# Patient Record
Sex: MALE | Race: BLACK OR AFRICAN AMERICAN | Employment: OTHER | URBAN - METROPOLITAN AREA
[De-identification: names, ages, dates, MRNs, and addresses within clinical notes are randomized per-mention and may not be internally consistent; named-entity substitution may affect disease eponyms.]

---

## 2021-07-20 ENCOUNTER — HOSPITAL ENCOUNTER (EMERGENCY)
Age: 76
Discharge: HOME | End: 2021-07-20
Attending: STUDENT IN AN ORGANIZED HEALTH CARE EDUCATION/TRAINING PROGRAM
Payer: COMMERCIAL

## 2021-07-20 ENCOUNTER — APPOINTMENT (OUTPATIENT)
Dept: CT IMAGING | Age: 76
End: 2021-07-20
Attending: PHYSICIAN ASSISTANT
Payer: COMMERCIAL

## 2021-07-20 VITALS
SYSTOLIC BLOOD PRESSURE: 148 MMHG | RESPIRATION RATE: 16 BRPM | WEIGHT: 220 LBS | OXYGEN SATURATION: 96 % | DIASTOLIC BLOOD PRESSURE: 93 MMHG | BODY MASS INDEX: 28.23 KG/M2 | HEART RATE: 85 BPM | HEIGHT: 74 IN | TEMPERATURE: 97.1 F

## 2021-07-20 DIAGNOSIS — M54.42 ACUTE LEFT-SIDED LOW BACK PAIN WITH LEFT-SIDED SCIATICA: Primary | ICD-10-CM

## 2021-07-20 PROCEDURE — 72131 CT LUMBAR SPINE W/O DYE: CPT

## 2021-07-20 PROCEDURE — 74011000250 HC RX REV CODE- 250: Performed by: PHYSICIAN ASSISTANT

## 2021-07-20 PROCEDURE — 72192 CT PELVIS W/O DYE: CPT

## 2021-07-20 PROCEDURE — 99283 EMERGENCY DEPT VISIT LOW MDM: CPT

## 2021-07-20 RX ORDER — METHYLPREDNISOLONE 4 MG/1
TABLET ORAL
Qty: 1 DOSE PACK | Refills: 0 | Status: SHIPPED | OUTPATIENT
Start: 2021-07-20

## 2021-07-20 RX ORDER — LIDOCAINE 4 G/100G
1 PATCH TOPICAL EVERY 24 HOURS
Status: DISCONTINUED | OUTPATIENT
Start: 2021-07-20 | End: 2021-07-20 | Stop reason: HOSPADM

## 2021-07-20 NOTE — ED TRIAGE NOTES
Patient ambulated to triage using a cane with a steady gait-    Pt reports 6 weeks ago he had a fall and injured his right shoulder- shortly after the fall he developed left lower back/hip pain that radiates down his left leg    Pt reports he was seen at the [place] a month ago for all the same pain and was prescribed steroids and a cream but denies getting any relief.

## 2021-07-20 NOTE — ED PROVIDER NOTES
Harish Maciel is a 77 y.o. male who presents to ED with cc of 7/10 left lower back/hip pain which radiates down his left leg. Patient reports that 6 weeks ago, he had a fall and injured his right shoulder and had onset of lower back pain shortly after this. He reports being seen at the [place] 1 month prior for these same complaints for which they performed x-rays to regions of complaint and prescribed a course of steroids and a cream which he took without improvement. Pt denies additional symptoms of F/C, cough, congestion, CP, SOB, urinary or fecal changes, syncope, visual changes, neck pain, wound. PCP: Parisa Bernal MD    There are no other complaints verbalized at this time. No past medical history on file. No past surgical history on file. No family history on file. Social History     Socioeconomic History    Marital status: Not on file     Spouse name: Not on file    Number of children: Not on file    Years of education: Not on file    Highest education level: Not on file   Occupational History    Not on file   Tobacco Use    Smoking status: Not on file   Substance and Sexual Activity    Alcohol use: Not on file    Drug use: Not on file    Sexual activity: Not on file   Other Topics Concern    Not on file   Social History Narrative    Not on file     Social Determinants of Health     Financial Resource Strain:     Difficulty of Paying Living Expenses:    Food Insecurity:     Worried About Running Out of Food in the Last Year:     [address] in the Last Year:    Transportation Needs:     Lack of Transportation (Medical):      Lack of Transportation (Non-Medical):    Physical Activity:     Days of Exercise per Week:     Minutes of Exercise per Session:    Stress:     Feeling of Stress :    Social Connections:     Frequency of Communication with Friends and Family:     Frequency of Social Gatherings with Friends and Family:     Attends [employer] Services:     Active Member of Clubs or Organizations:     Attends Club or Organization Meetings:     Marital Status:    Intimate Partner Violence:     Fear of Current or Ex-Partner:     Emotionally Abused:     Physically Abused:     Sexually Abused: ALLERGIES: Patient has no allergy information on record. Review of Systems   Constitutional: Negative for chills and fever. HENT: Negative for congestion. Eyes: Negative for visual disturbance. Respiratory: Negative for cough and shortness of breath. Cardiovascular: Negative for chest pain. Gastrointestinal: Negative for abdominal pain, constipation, diarrhea, nausea and vomiting. Genitourinary: Negative for dysuria and frequency. Musculoskeletal: Positive for arthralgias and back pain. Negative for neck pain. Skin: Negative for wound. Neurological: Negative for syncope. All other systems reviewed and are negative. Vitals:    07/20/21 1236   BP: (!) 148/93   Pulse: 85   Resp: 16   Temp: 97.1 °F (36.2 °C)   SpO2: 96%   Weight: 99.8 kg (220 lb)   Height: 6' 2\" (1.88 m)            Physical Exam  Vitals and nursing note reviewed. Constitutional:       Appearance: He is not toxic-appearing or diaphoretic. HENT:      Head: Normocephalic. Right Ear: External ear normal.      Left Ear: External ear normal.   Eyes:      General: No scleral icterus. Conjunctiva/sclera: Conjunctivae normal.   Cardiovascular:      Rate and Rhythm: Normal rate. Pulmonary:      Effort: Pulmonary effort is normal. No respiratory distress. Abdominal:      General: There is no distension. Musculoskeletal:         General: No swelling or deformity. Cervical back: Normal range of motion. Thoracic back: No tenderness or bony tenderness. Lumbar back: Tenderness ( L paraspinal) present. No bony tenderness. Right hip: No bony tenderness. Left hip: No bony tenderness.       Comments: No focal tenderness to palpation aspects of right shoulder. Decreased ROM with abduction. 5/5 strength to biceps, triceps,  strength. 2+ radial pulse, capillary refill less than 2 seconds, warm extremity. Sensation intact grossly throughout. 5/5 strength to flexion/extension of hips, knees, ankles bilaterally. 2+ posterior tibial pulses, warm extremities, capillary refill less than 2 seconds. Sensation intact throughout BL LE. Skin:     General: Skin is warm and dry. Neurological:      Mental Status: He is alert and oriented to person, place, and time. Mental status is at baseline. Deep Tendon Reflexes:      Reflex Scores:       Patellar reflexes are 2+ on the right side and 2+ on the left side. Comments: Ambulatory with cane. MDM  Number of Diagnoses or Management Options     Amount and/or Complexity of Data Reviewed  Tests in the radiology section of CPT®: ordered and reviewed  Discuss the patient with other providers: yes (Dr. Baxter Ganser, ED attending  Dr. Cierra Abarca, orthopedics)           Procedures             CONSULT NOTE:   3:48 PM  Rosibel Hightower PA-C spoke with Dr Cierra Abarca and Ruddy PAK,   Specialty: Orthopedics  Discussed pt's hx, disposition, and available diagnostic and imaging results. Reviewed care plans. Recommends Medrol Dosepak and follow-up with orthospine as outpatient        VITAL SIGNS:  Vitals:    07/20/21 1236   BP: (!) 148/93   Pulse: 85   Resp: 16   Temp: 97.1 °F (36.2 °C)   SpO2: 96%   Weight: 99.8 kg (220 lb)   Height: 6' 2\" (1.88 m)         LABS:  No results found for this or any previous visit (from the past 24 hour(s)). IMAGING:  CT PELV WO CONT   Final Result      1. No evidence of hip fracture or other acute osseous abnormality. 2. Moderate bilateral hip DJD. CT SPINE LUMB WO CONT   Final Result      There is no acute fracture or dislocation. There is multilevel disc and facet degenerative change. Right renal calculi.       Moderate to severe canal stenosis and mild bilateral foraminal stenosis at L4-5. Additional, less severe degenerative findings are as described in detail above. Medications During Visit:  Medications - No data to display      DECISION MAKING:    Kika Hidalgo is a 77 y.o. male who comes in as above. Presents afebrile and hemodynamically stable. Reports pain to right shoulder and left lower back which radiates down his left lower extremity with onset after a fall 6 weeks ago. Out patient x-ray to right shoulder reported to be negative, neurovascularly intact with 5/5 strength on physical exam but with decreased ROM right shoulder secondary to pain. Will have follow-up with orthopedics for continued management. Reported to have x-rays at outside facility for lower back pain however given age and continued symptoms, CT is obtained. CT pelvis notable for moderate bilateral hip degenerative joint disease but no evidence of hip fracture or other acute abnormality. CT lumbar demonstrating no acute fracture or dislocation but notable for facet arthropathy, ligamentum flavum hypertrophy with disc bulge and mild canal stenosis at multiple levels. Discussed case with ED attending. Given this finding along with symptoms, have discussed case with orthopedics. Recommends Medrol dose pack and follow-up with orthospine as outpatient. Pt and I have discussed close return precautions as well as follow up recommendations. Opportunity for questions presented. Pt verbalizes their understanding and agreement with care plan. IMPRESSION:  1.  Acute left-sided low back pain with left-sided sciatica        DISPOSITION:  Discharged      Discharge Medication List as of 7/20/2021  3:58 PM      START taking these medications    Details   methylPREDNISolone (Medrol, Mihir,) 4 mg tablet Please take as directed on packet, Print, Disp-1 Dose Pack, R-0              Follow-up Information     Follow up With Specialties Details Why Contact Info    Laverne Agus Collazo MD Orthopedic Surgery Schedule an appointment as soon as possible for a visit  Please follow up with him regarding your shoulder pain [address]  [address] [facility]  [phone]      [facility] EMERGENCY DEPT Emergency Medicine Go to  As needed, If symptoms worsen, if onset of loss of control of your bowels or bladder, numbness to your groin, new or other concerning symptoms [address] [facility]  [phone]    Alena Marroquin MD Orthopedic Surgery Schedule an appointment as soon as possible for a visit  Please follow up with him regarding your back pain and the CT findings [address] [facility]  [phone]              The patient is asked to follow-up with their primary care provider and any other physicians as above in the next several days. They are to call tomorrow for an appointment. We have discussed strict return precautions and the patient is asked to return promptly for any increased concerns or worsening of symptoms and for return precautions regarding their symptoms today. They can return to this emergency department or any other emergency department. I have discussed with them results as above and presented opportunity for questions. They verbalize their understanding of the aboveand agreement with care plan. Please note that this dictation was completed with [employer], the computer voice recognition software. Quite often unanticipated grammatical, syntax, homophones, and other interpretive errors are inadvertently transcribed by the computer software. Please disregard these errors. Please excuse any errors that have escaped final proofreading.